# Patient Record
Sex: MALE | Race: WHITE | NOT HISPANIC OR LATINO | Employment: FULL TIME | URBAN - METROPOLITAN AREA
[De-identification: names, ages, dates, MRNs, and addresses within clinical notes are randomized per-mention and may not be internally consistent; named-entity substitution may affect disease eponyms.]

---

## 2019-10-23 ENCOUNTER — OFFICE VISIT (OUTPATIENT)
Dept: URGENT CARE | Facility: CLINIC | Age: 59
End: 2019-10-23

## 2019-10-23 VITALS
OXYGEN SATURATION: 98 % | HEART RATE: 77 BPM | RESPIRATION RATE: 18 BRPM | SYSTOLIC BLOOD PRESSURE: 134 MMHG | DIASTOLIC BLOOD PRESSURE: 78 MMHG | TEMPERATURE: 98.7 F

## 2019-10-23 DIAGNOSIS — S81.812A LACERATION OF LEFT LOWER LEG, INITIAL ENCOUNTER: Primary | ICD-10-CM

## 2019-10-23 PROCEDURE — G0382 LEV 3 HOSP TYPE B ED VISIT: HCPCS | Performed by: PHYSICIAN ASSISTANT

## 2019-10-23 PROCEDURE — 90471 IMMUNIZATION ADMIN: CPT | Performed by: PHYSICIAN ASSISTANT

## 2019-10-23 PROCEDURE — 12002 RPR S/N/AX/GEN/TRNK2.6-7.5CM: CPT | Performed by: PHYSICIAN ASSISTANT

## 2019-10-23 PROCEDURE — 90715 TDAP VACCINE 7 YRS/> IM: CPT

## 2019-10-23 RX ORDER — CEPHALEXIN 500 MG/1
500 CAPSULE ORAL EVERY 6 HOURS SCHEDULED
Qty: 28 CAPSULE | Refills: 0 | Status: SHIPPED | OUTPATIENT
Start: 2019-10-23 | End: 2019-10-30

## 2019-10-23 NOTE — PROGRESS NOTES
[facility]        NAME: Ai Farnsworth is a 62 y o  male  : 1960    MRN: [id number]  DATE: 2019  TIME: 7:54 AM    Assessment and Plan   Laceration of left lower leg, initial encounter [S81 812A]  1  Laceration of left lower leg, initial encounter  TDAP Vaccine greater than or equal to 8yo    cephalexin (KEFLEX) 500 mg capsule    Laceration repair     Laceration repair  Date/Time: 10/23/2019 2:46 PM  Performed by: Corey Velasquez PA-C  Authorized by: Corey Velasquez PA-C   Consent given by: patient  Body area: lower extremity  Location details: left lower leg  Laceration length: 6 cm  Foreign bodies: no foreign bodies    Anesthesia:  Local Anesthetic: lidocaine 1% with epinephrine  Anesthetic total: 3 mL      Procedure Details:  Irrigation solution: saline  Skin closure: 4-0 nylon  Number of sutures: 9  Patient tolerance: Patient tolerated the procedure well with no immediate complications        Patient Instructions       Follow up with PCP in 3-5 days  Proceed to  ER if symptoms worsen  Chief Complaint     Chief Complaint   Patient presents with    Extremity Laceration     Pt reports taking his trash out and a  cutting his left lower leg today  History of Present Illness       62 y o  Male presents for evaluation of laceration to the left lower leg  Patient states an hour ago he was taking out the garbage when he noted a metal coat  caught on to his pant leg  Patient is not on anticoagulation  Unable to control bleed prior to arrival  He is unsure when the last tetanus shot was  Denies lightheadedness or dizziness  Review of Systems   Review of Systems   Constitutional: Negative for chills, fatigue and fever  HENT: Negative for congestion, ear pain, sinus pain, sore throat and trouble swallowing  Eyes: Negative for pain, discharge and redness  Respiratory: Negative for cough, chest tightness, shortness of breath and wheezing  Cardiovascular: Negative for chest pain, palpitations and leg swelling  Gastrointestinal: Negative for abdominal pain, diarrhea, nausea and vomiting  Musculoskeletal: Negative for arthralgias, joint swelling and myalgias  Skin: Positive for wound  Negative for rash  Neurological: Negative for dizziness, weakness, numbness and headaches  Current Medications       Current Outpatient Medications:     cephalexin (KEFLEX) 500 mg capsule, Take 1 capsule (500 mg total) by mouth every 6 (six) hours for 7 days, Disp: 28 capsule, Rfl: 0    Current Allergies     Allergies as of 10/23/2019    (No Known Allergies)            The following portions of the patient's history were reviewed and updated as appropriate: allergies, current medications, past family history, past medical history, past social history, past surgical history and problem list      History reviewed  No pertinent past medical history  Past Surgical History:   Procedure Laterality Date    APPENDECTOMY      FEMUR SURGERY         History reviewed  No pertinent family history  Medications have been verified  Objective   /78   Pulse 77   Temp 98 7 °F (37 1 °C)   Resp 18   SpO2 98%        Physical Exam     Physical Exam   Constitutional: Vital signs are normal  He appears well-developed  No distress  Cardiovascular: Normal rate, regular rhythm, normal heart sounds and intact distal pulses     Pulmonary/Chest: Effort normal and breath sounds normal    Skin:

## 2020-01-24 ENCOUNTER — OFFICE VISIT (OUTPATIENT)
Dept: URGENT CARE | Facility: CLINIC | Age: 60
End: 2020-01-24
Payer: COMMERCIAL

## 2020-01-24 VITALS
OXYGEN SATURATION: 94 % | TEMPERATURE: 98 F | SYSTOLIC BLOOD PRESSURE: 110 MMHG | BODY MASS INDEX: 29.8 KG/M2 | DIASTOLIC BLOOD PRESSURE: 64 MMHG | RESPIRATION RATE: 18 BRPM | HEIGHT: 72 IN | WEIGHT: 220 LBS | HEART RATE: 96 BPM

## 2020-01-24 DIAGNOSIS — R30.0 DYSURIA: Primary | ICD-10-CM

## 2020-01-24 LAB
SL AMB  POCT GLUCOSE, UA: NEGATIVE
SL AMB LEUKOCYTE ESTERASE,UA: ABNORMAL
SL AMB POCT BILIRUBIN,UA: NEGATIVE
SL AMB POCT BLOOD,UA: ABNORMAL
SL AMB POCT CLARITY,UA: ABNORMAL
SL AMB POCT COLOR,UA: ABNORMAL
SL AMB POCT KETONES,UA: NEGATIVE
SL AMB POCT NITRITE,UA: POSITIVE
SL AMB POCT PH,UA: 5
SL AMB POCT SPECIFIC GRAVITY,UA: 1.03
SL AMB POCT URINE PROTEIN: 2000
SL AMB POCT UROBILINOGEN: 0.2

## 2020-01-24 PROCEDURE — 81002 URINALYSIS NONAUTO W/O SCOPE: CPT | Performed by: PHYSICIAN ASSISTANT

## 2020-01-24 PROCEDURE — 87077 CULTURE AEROBIC IDENTIFY: CPT | Performed by: PHYSICIAN ASSISTANT

## 2020-01-24 PROCEDURE — 87186 SC STD MICRODIL/AGAR DIL: CPT | Performed by: PHYSICIAN ASSISTANT

## 2020-01-24 PROCEDURE — 87086 URINE CULTURE/COLONY COUNT: CPT | Performed by: PHYSICIAN ASSISTANT

## 2020-01-24 PROCEDURE — G0382 LEV 3 HOSP TYPE B ED VISIT: HCPCS | Performed by: PHYSICIAN ASSISTANT

## 2020-01-24 RX ORDER — CIPROFLOXACIN 500 MG/1
500 TABLET, FILM COATED ORAL EVERY 12 HOURS SCHEDULED
Qty: 10 TABLET | Refills: 0 | Status: SHIPPED | OUTPATIENT
Start: 2020-01-24 | End: 2020-01-29

## 2020-01-24 NOTE — PATIENT INSTRUCTIONS

## 2020-01-24 NOTE — PROGRESS NOTES
[facility]        NAME: Malachi Sanchez is a 62 y o  male  : 1960    MRN: [id number]  DATE: 2020  TIME: 2:43 PM    Assessment and Plan   Dysuria [R30 0]  1  Dysuria  POCT urine dip    Urine culture    ciprofloxacin (CIPRO) 500 mg tablet    Ambulatory referral to Urology         Patient Instructions     Take antibiotic as directed  Eat yogurt to avoid GI upset  Increase fluid intake  Avoid holding bladder for prolonged periods  Urinate after intercourse  Always wipe front to back  Go to the ER for worsening symptoms: flank pain, fever/chills, nausea/vomiting, lower back pain or any other concerning symptoms  Follow up with PCP in 3-5 days  Proceed to  ER if symptoms worsen  Chief Complaint     Chief Complaint   Patient presents with    Possible UTI     on and off x 1-2 weeks  complains of pain in the lower abdomen, urinary hesitency, bladder fullness, incomplete emptying of his bladder  History of Present Illness       Urinary Tract Infection    This is a new problem  The current episode started 1 to 4 weeks ago  The problem occurs intermittently  The problem has been waxing and waning  The quality of the pain is described as aching and burning  The pain is at a severity of 6/10  The pain is moderate  There has been no fever  He is sexually active  There is no history of pyelonephritis  Associated symptoms include frequency, hematuria, hesitancy, nausea and urgency  Pertinent negatives include no chills, discharge, flank pain, possible pregnancy, sweats or vomiting  He has tried nothing for the symptoms  There is no history of catheterization, recurrent UTIs or urinary stasis  Review of Systems   Review of Systems   Constitutional: Negative for chills, fatigue and fever  HENT: Negative for congestion, ear pain, hearing loss, postnasal drip, sinus pressure, sinus pain and sore throat  Eyes: Negative for pain and discharge     Respiratory: Negative for chest tightness and shortness of breath  Cardiovascular: Negative for chest pain  Gastrointestinal: Positive for nausea  Negative for abdominal pain, constipation and vomiting  Genitourinary: Positive for frequency, hematuria, hesitancy and urgency  Negative for difficulty urinating and flank pain  Musculoskeletal: Negative for arthralgias and myalgias  Skin: Negative for rash  Neurological: Negative for dizziness and headaches  Psychiatric/Behavioral: Negative for behavioral problems  Current Medications       Current Outpatient Medications:     ciprofloxacin (CIPRO) 500 mg tablet, Take 1 tablet (500 mg total) by mouth every 12 (twelve) hours for 5 days, Disp: 10 tablet, Rfl: 0    Current Allergies     Allergies as of 01/24/2020    (No Known Allergies)            The following portions of the patient's history were reviewed and updated as appropriate: allergies, current medications, past family history, past medical history, past social history, past surgical history and problem list      History reviewed  No pertinent past medical history  Past Surgical History:   Procedure Laterality Date    APPENDECTOMY      FEMUR SURGERY         History reviewed  No pertinent family history  Medications have been verified  Objective   /64 (BP Location: Left arm, Patient Position: Sitting)   Pulse 96   Temp 98 °F (36 7 °C) (Temporal)   Resp 18   Ht 6' (1 829 m)   Wt 99 8 kg (220 lb)   SpO2 94%   BMI 29 84 kg/m²        Physical Exam     Physical Exam   Constitutional: He is oriented to person, place, and time  He appears well-developed and well-nourished  HENT:   Right Ear: Tympanic membrane and external ear normal    Left Ear: Tympanic membrane and external ear normal    Neck: Normal range of motion  No edema present  Cardiovascular: Normal rate, regular rhythm, S1 normal, S2 normal and normal heart sounds  No murmur heard    Pulmonary/Chest: Effort normal and breath sounds normal  No respiratory distress  He has no wheezes  He has no rales  He exhibits no tenderness  Abdominal: Soft  Normal appearance and bowel sounds are normal  He exhibits no distension  There is tenderness in the suprapubic area  There is no rigidity, no rebound, no guarding, no CVA tenderness, no tenderness at McBurney's point and negative Pantoja's sign  Lymphadenopathy:     He has no cervical adenopathy  Neurological: He is alert and oriented to person, place, and time  Skin: Skin is warm, dry and intact  No rash noted  Psychiatric: He has a normal mood and affect  His speech is normal and behavior is normal    Nursing note and vitals reviewed

## 2020-01-26 LAB — BACTERIA UR CULT: ABNORMAL

## 2020-01-27 ENCOUNTER — TELEPHONE (OUTPATIENT)
Dept: URGENT CARE | Facility: CLINIC | Age: 60
End: 2020-01-27

## 2020-01-27 NOTE — TELEPHONE ENCOUNTER
Patient walked in requesting urine culture results  Informed patient results showed E  Coli bacteria in urine  Pt has been taking Ciprofloxacin as prescribed  Advised pt to complete course of antibiotics and if symptoms do not improve to follow up with PCP